# Patient Record
Sex: MALE | Race: BLACK OR AFRICAN AMERICAN | NOT HISPANIC OR LATINO | URBAN - METROPOLITAN AREA
[De-identification: names, ages, dates, MRNs, and addresses within clinical notes are randomized per-mention and may not be internally consistent; named-entity substitution may affect disease eponyms.]

---

## 2020-12-22 ENCOUNTER — OFFICE VISIT (OUTPATIENT)
Dept: URBAN - METROPOLITAN AREA CLINIC 94 | Facility: CLINIC | Age: 65
End: 2020-12-22

## 2021-01-12 ENCOUNTER — WEB ENCOUNTER (OUTPATIENT)
Dept: URBAN - METROPOLITAN AREA CLINIC 94 | Facility: CLINIC | Age: 66
End: 2021-01-12

## 2021-01-12 ENCOUNTER — OFFICE VISIT (OUTPATIENT)
Dept: URBAN - METROPOLITAN AREA CLINIC 94 | Facility: CLINIC | Age: 66
End: 2021-01-12
Payer: MEDICARE

## 2021-01-12 DIAGNOSIS — K51.90 ULCERATIVE COLITIS: ICD-10-CM

## 2021-01-12 PROCEDURE — G8420 CALC BMI NORM PARAMETERS: HCPCS | Performed by: INTERNAL MEDICINE

## 2021-01-12 PROCEDURE — 99213 OFFICE O/P EST LOW 20 MIN: CPT | Performed by: INTERNAL MEDICINE

## 2021-01-12 PROCEDURE — G8427 DOCREV CUR MEDS BY ELIG CLIN: HCPCS | Performed by: INTERNAL MEDICINE

## 2021-01-12 PROCEDURE — 3017F COLORECTAL CA SCREEN DOC REV: CPT | Performed by: INTERNAL MEDICINE

## 2021-01-12 RX ORDER — MESALAMINE 1.2 G/1
2 TABLETS TABLET, DELAYED RELEASE ORAL ONCE A DAY
Refills: 0 | Status: ACTIVE | COMMUNITY
Start: 1900-01-01

## 2021-01-12 RX ORDER — MESALAMINE 1.2 G/1
2 TABLETS TABLET, DELAYED RELEASE ORAL ONCE A DAY
Qty: 180 TABLET | Refills: 4

## 2021-01-12 RX ORDER — BIFIDOBACTERIUM LONGUM 10MM CELL
CAPSULE ORAL
Qty: 0 | Refills: 0 | Status: DISCONTINUED | COMMUNITY
Start: 1900-01-01

## 2021-01-12 RX ORDER — TROLAMINE SALICYLATE 10 %
APPLY TO AFFECTED AREA CREAM (GRAM) TOPICAL
Qty: 1 | Refills: 0 | Status: DISCONTINUED | COMMUNITY
Start: 1900-01-01

## 2021-01-12 RX ORDER — ACETAMINOPHEN 650 MG/1
TABLET, FILM COATED, EXTENDED RELEASE ORAL
Qty: 0 | Refills: 0 | Status: DISCONTINUED | COMMUNITY
Start: 1900-01-01

## 2021-01-12 RX ORDER — IBUPROFEN 200 MG/1
1 TABLET WITH FOOD OR MILK AS NEEDED TABLET, FILM COATED ORAL PRN
Status: ACTIVE | COMMUNITY

## 2021-01-12 NOTE — HPI-TODAY'S VISIT:
Pt has history of UC for 15 years. Last colonoscopy 2018 showed UC in remission without any dysplasia. Pt currently in clinical remission on lialda 2.4 g/day. Pt denies any GI symptoms such as diarrhea, abdominal pain or rectal bleeding.

## 2021-01-26 ENCOUNTER — TELEPHONE ENCOUNTER (OUTPATIENT)
Dept: URBAN - METROPOLITAN AREA CLINIC 94 | Facility: CLINIC | Age: 66
End: 2021-01-26

## 2021-05-05 PROBLEM — 235595009 GASTROESOPHAGEAL REFLUX DISEASE: Status: ACTIVE | Noted: 2021-05-05

## 2021-05-21 ENCOUNTER — OFFICE VISIT (OUTPATIENT)
Dept: URBAN - METROPOLITAN AREA SURGERY CENTER 17 | Facility: SURGERY CENTER | Age: 66
End: 2021-05-21

## 2021-06-02 ENCOUNTER — OFFICE VISIT (OUTPATIENT)
Dept: URBAN - METROPOLITAN AREA SURGERY CENTER 17 | Facility: SURGERY CENTER | Age: 66
End: 2021-06-02

## 2021-06-29 ENCOUNTER — OFFICE VISIT (OUTPATIENT)
Dept: URBAN - METROPOLITAN AREA SURGERY CENTER 17 | Facility: SURGERY CENTER | Age: 66
End: 2021-06-29
Payer: MEDICARE

## 2021-06-29 DIAGNOSIS — K63.89 BACTERIAL OVERGROWTH SYNDROME: ICD-10-CM

## 2021-06-29 DIAGNOSIS — K51.00 CHRONIC PANCOLONIC ULCERATIVE COLITIS: ICD-10-CM

## 2021-06-29 PROCEDURE — 45380 COLONOSCOPY AND BIOPSY: CPT | Performed by: INTERNAL MEDICINE

## 2021-06-29 PROCEDURE — G8907 PT DOC NO EVENTS ON DISCHARG: HCPCS | Performed by: INTERNAL MEDICINE

## 2021-06-29 RX ORDER — IBUPROFEN 200 MG/1
1 TABLET WITH FOOD OR MILK AS NEEDED TABLET, FILM COATED ORAL PRN
Status: ACTIVE | COMMUNITY

## 2021-06-29 RX ORDER — MESALAMINE 1.2 G/1
2 TABLETS TABLET, DELAYED RELEASE ORAL ONCE A DAY
Qty: 180 TABLET | Refills: 4 | Status: ACTIVE | COMMUNITY

## 2022-02-09 ENCOUNTER — TELEPHONE ENCOUNTER (OUTPATIENT)
Dept: URBAN - METROPOLITAN AREA CLINIC 94 | Facility: CLINIC | Age: 67
End: 2022-02-09

## 2022-02-09 RX ORDER — MESALAMINE 1.2 G/1
2 TABLETS TABLET, DELAYED RELEASE ORAL ONCE A DAY
Qty: 180 TABLET | Refills: 4
End: 2023-05-05

## 2022-03-28 ENCOUNTER — TELEPHONE ENCOUNTER (OUTPATIENT)
Dept: URBAN - METROPOLITAN AREA CLINIC 94 | Facility: CLINIC | Age: 67
End: 2022-03-28

## 2022-03-28 RX ORDER — MESALAMINE 1.2 G/1
2 TABLETS TABLET, DELAYED RELEASE ORAL ONCE A DAY
Qty: 180 TABLET | Refills: 4
End: 2023-06-21

## 2022-04-08 ENCOUNTER — TELEPHONE ENCOUNTER (OUTPATIENT)
Dept: URBAN - METROPOLITAN AREA CLINIC 94 | Facility: CLINIC | Age: 67
End: 2022-04-08

## 2022-04-12 ENCOUNTER — WEB ENCOUNTER (OUTPATIENT)
Dept: URBAN - METROPOLITAN AREA CLINIC 94 | Facility: CLINIC | Age: 67
End: 2022-04-12

## 2022-04-12 ENCOUNTER — OFFICE VISIT (OUTPATIENT)
Dept: URBAN - METROPOLITAN AREA CLINIC 94 | Facility: CLINIC | Age: 67
End: 2022-04-12
Payer: MEDICARE

## 2022-04-12 VITALS
SYSTOLIC BLOOD PRESSURE: 164 MMHG | DIASTOLIC BLOOD PRESSURE: 85 MMHG | HEART RATE: 79 BPM | WEIGHT: 201 LBS | TEMPERATURE: 97.5 F | HEIGHT: 68 IN | BODY MASS INDEX: 30.46 KG/M2

## 2022-04-12 DIAGNOSIS — K51.90 ULCERATIVE COLITIS: ICD-10-CM

## 2022-04-12 PROCEDURE — 99213 OFFICE O/P EST LOW 20 MIN: CPT | Performed by: PHYSICIAN ASSISTANT

## 2022-04-12 RX ORDER — MESALAMINE 1.2 G/1
2 TABLETS TABLET, DELAYED RELEASE ORAL ONCE A DAY
Qty: 180 TABLET | Refills: 4 | Status: ACTIVE | COMMUNITY
End: 2023-06-21

## 2022-04-12 RX ORDER — MESALAMINE 1.2 G/1
2 TABLETS TABLET, DELAYED RELEASE ORAL ONCE A DAY
Qty: 180 | Refills: 2

## 2022-04-12 RX ORDER — IBUPROFEN 200 MG/1
1 TABLET WITH FOOD OR MILK AS NEEDED TABLET, FILM COATED ORAL PRN
Status: ON HOLD | COMMUNITY

## 2022-04-12 NOTE — HPI-TODAY'S VISIT:
67 yo M evaluated today for hx of UC.   Pt has history of UC for 15 years. Last colonoscopy 2021 showed UC in remission without any dysplasia. Pt currently in clinical remission on lialda 2.4 g/day. Pt denies any GI symptoms such as diarrhea, abdominal pain or rectal bleeding, or changes in bowel habits.

## 2022-04-12 NOTE — PHYSICAL EXAM LYMPHATIC:
Neck , no lymphadenopathy · Goal while inpatient is a systolic blood pressure less than 160mmHg  · BP in acceptable range at this time  · Continue current home regimen with hold parameters  · PRN antihypertensives available

## 2022-04-13 LAB
A/G RATIO: 1.5
ALBUMIN: 4.8
ALKALINE PHOSPHATASE: 138
ALT (SGPT): 29
AST (SGOT): 22
BASO (ABSOLUTE): 0
BASOS: 0
BILIRUBIN, TOTAL: 0.6
BUN/CREATININE RATIO: 19
BUN: 20
CALCIUM: 9.6
CARBON DIOXIDE, TOTAL: 21
CHLORIDE: 100
CREATININE: 1.08
EGFR: 75
EOS (ABSOLUTE): 0.1
EOS: 1
GLOBULIN, TOTAL: 3.2
GLUCOSE: 85
HEMATOCRIT: 44.8
HEMATOLOGY COMMENTS:: (no result)
HEMOGLOBIN: 14.5
IMMATURE CELLS: (no result)
IMMATURE GRANS (ABS): 0
IMMATURE GRANULOCYTES: 0
LYMPHS (ABSOLUTE): 2.1
LYMPHS: 21
MCH: 27.5
MCHC: 32.4
MCV: 85
MONOCYTES(ABSOLUTE): 0.9
MONOCYTES: 9
NEUTROPHILS (ABSOLUTE): 6.7
NEUTROPHILS: 69
NRBC: (no result)
PLATELETS: 296
POTASSIUM: 4.4
PROTEIN, TOTAL: 8
RBC: 5.27
RDW: 13.6
SODIUM: 137
WBC: 9.8

## 2022-06-24 NOTE — PHYSICAL EXAM CONSTITUTIONAL:
-fmhx of colon cancer grandfather  -due for screening colonoscopy soon at 39 y/o   agreeable for GI referral and placed well developed, well nourished , in no acute distress , ambulating without difficulty , normal communication ability

## 2023-03-07 ENCOUNTER — OFFICE VISIT (OUTPATIENT)
Dept: URBAN - METROPOLITAN AREA CLINIC 94 | Facility: CLINIC | Age: 68
End: 2023-03-07
Payer: MEDICARE

## 2023-03-07 VITALS
SYSTOLIC BLOOD PRESSURE: 150 MMHG | BODY MASS INDEX: 27.13 KG/M2 | DIASTOLIC BLOOD PRESSURE: 80 MMHG | HEART RATE: 74 BPM | TEMPERATURE: 97.3 F | HEIGHT: 68 IN | WEIGHT: 179 LBS

## 2023-03-07 DIAGNOSIS — K51.80 OTHER ULCERATIVE COLITIS: ICD-10-CM

## 2023-03-07 PROBLEM — 64766004 ULCERATIVE COLITIS: Status: ACTIVE | Noted: 2021-01-12

## 2023-03-07 PROCEDURE — 99214 OFFICE O/P EST MOD 30 MIN: CPT | Performed by: PHYSICIAN ASSISTANT

## 2023-03-07 RX ORDER — IBUPROFEN 200 MG/1
1 TABLET WITH FOOD OR MILK AS NEEDED TABLET, FILM COATED ORAL PRN
Status: ON HOLD | COMMUNITY

## 2023-03-07 RX ORDER — MESALAMINE 1.2 G/1
2 TABLETS TABLET, DELAYED RELEASE ORAL ONCE A DAY
Qty: 180 | Refills: 4

## 2023-03-07 RX ORDER — MESALAMINE 1.2 G/1
2 TABLETS TABLET, DELAYED RELEASE ORAL ONCE A DAY
Qty: 180 | Refills: 2 | Status: ACTIVE | COMMUNITY

## 2023-03-07 NOTE — HPI-TODAY'S VISIT:
65 yo M evaluated today for hx of UC.   Today patient returns for 1 yr f/u visit. Pt is doing very well and denies GI sx. Pt denies abdominal pain, melena or heamtochezia. Pt denies diarrhea and reports having 1 solid stool/day. Denies unintentional wt loss.   Pt has history of UC for 15 years. Last colonoscopy 2021 showed UC in remission without any dysplasia. Pt currently in clinical remission on lialda 2.4 g/day.

## 2023-03-08 LAB
A/G RATIO: 1.5
ABSOLUTE BASOPHILS: 19
ABSOLUTE EOSINOPHILS: 82
ABSOLUTE LYMPHOCYTES: 1720
ABSOLUTE MONOCYTES: 529
ABSOLUTE NEUTROPHILS: 3950
ALBUMIN: 4.3
ALKALINE PHOSPHATASE: 98
ALT (SGPT): 21
AST (SGOT): 23
BASOPHILS: 0.3
BILIRUBIN, TOTAL: 0.6
BUN/CREATININE RATIO: (no result)
BUN: 12
C-REACTIVE PROTEIN, QUANT: 3.8
CALCIUM: 9.4
CARBON DIOXIDE, TOTAL: 26
CHLORIDE: 102
CREATININE: 0.93
EGFR: 89
EOSINOPHILS: 1.3
GLOBULIN, TOTAL: 2.9
GLUCOSE: 81
HEMATOCRIT: 40.6
HEMOGLOBIN: 13.7
LYMPHOCYTES: 27.3
MCH: 28.1
MCHC: 33.7
MCV: 83.2
MONOCYTES: 8.4
MPV: 10.2
NEUTROPHILS: 62.7
PLATELET COUNT: 254
POTASSIUM: 3.9
PROTEIN, TOTAL: 7.2
RDW: 13.3
RED BLOOD CELL COUNT: 4.88
SODIUM: 137
WHITE BLOOD CELL COUNT: 6.3

## 2023-03-14 PROBLEM — 64766004 ULCERATIVE COLITIS: Status: ACTIVE | Noted: 2023-03-14

## 2023-04-11 ENCOUNTER — OFFICE VISIT (OUTPATIENT)
Dept: URBAN - METROPOLITAN AREA CLINIC 94 | Facility: CLINIC | Age: 68
End: 2023-04-11

## 2023-07-11 ENCOUNTER — OFFICE VISIT (OUTPATIENT)
Dept: URBAN - METROPOLITAN AREA SURGERY CENTER 17 | Facility: SURGERY CENTER | Age: 68
End: 2023-07-11

## 2023-07-18 ENCOUNTER — OFFICE VISIT (OUTPATIENT)
Dept: URBAN - METROPOLITAN AREA SURGERY CENTER 17 | Facility: SURGERY CENTER | Age: 68
End: 2023-07-18

## 2023-07-18 ENCOUNTER — CLAIMS CREATED FROM THE CLAIM WINDOW (OUTPATIENT)
Dept: URBAN - METROPOLITAN AREA CLINIC 4 | Facility: CLINIC | Age: 68
End: 2023-07-18
Payer: MEDICARE

## 2023-07-18 ENCOUNTER — CLAIMS CREATED FROM THE CLAIM WINDOW (OUTPATIENT)
Dept: URBAN - METROPOLITAN AREA SURGERY CENTER 17 | Facility: SURGERY CENTER | Age: 68
End: 2023-07-18
Payer: MEDICARE

## 2023-07-18 DIAGNOSIS — K63.89 OTHER SPECIFIED DISEASES OF INTESTINE: ICD-10-CM

## 2023-07-18 DIAGNOSIS — K63.89 APPENDICITIS EPIPLOICA: ICD-10-CM

## 2023-07-18 PROCEDURE — 88305 TISSUE EXAM BY PATHOLOGIST: CPT | Performed by: PATHOLOGY

## 2023-07-18 PROCEDURE — G8907 PT DOC NO EVENTS ON DISCHARG: HCPCS | Performed by: INTERNAL MEDICINE

## 2023-07-18 PROCEDURE — 45380 COLONOSCOPY AND BIOPSY: CPT | Performed by: INTERNAL MEDICINE

## 2023-11-02 ENCOUNTER — DASHBOARD ENCOUNTERS (OUTPATIENT)
Age: 68
End: 2023-11-02

## 2023-11-02 ENCOUNTER — OFFICE VISIT (OUTPATIENT)
Dept: URBAN - METROPOLITAN AREA CLINIC 94 | Facility: CLINIC | Age: 68
End: 2023-11-02
Payer: MEDICARE

## 2023-11-02 VITALS
TEMPERATURE: 98.1 F | DIASTOLIC BLOOD PRESSURE: 69 MMHG | WEIGHT: 167 LBS | BODY MASS INDEX: 25.31 KG/M2 | SYSTOLIC BLOOD PRESSURE: 144 MMHG | HEART RATE: 64 BPM | HEIGHT: 68 IN

## 2023-11-02 DIAGNOSIS — K51.90: ICD-10-CM

## 2023-11-02 DIAGNOSIS — K64.8 INTERNAL HEMORRHOIDS: ICD-10-CM

## 2023-11-02 PROBLEM — 64766004: Status: ACTIVE | Noted: 2023-11-02

## 2023-11-02 PROCEDURE — 99214 OFFICE O/P EST MOD 30 MIN: CPT | Performed by: INTERNAL MEDICINE

## 2023-11-02 RX ORDER — MESALAMINE 1.2 G/1
2 TABLETS TABLET, DELAYED RELEASE ORAL ONCE A DAY
Qty: 180 | Refills: 4 | Status: ACTIVE | COMMUNITY

## 2023-11-02 RX ORDER — IBUPROFEN 200 MG/1
1 TABLET WITH FOOD OR MILK AS NEEDED TABLET, FILM COATED ORAL PRN
Status: ON HOLD | COMMUNITY

## 2023-11-02 NOTE — HPI-TODAY'S VISIT:
65 yo M evaluated today for hx of UC.Pt has history of UC for 15 years. Last colonoscopy 7/2023 showed normal mucosa to cecum and internal hemorrhoids. Biopsies from all areas of colon as per surveillance protocol were all normal without active colitis or dysplasia Pt states that he developed a dull rectal pain with BM a few weeks ago when he was straining. SInce then he has changed his diet with more fiber and pain has resolved. Denies rectal bleeding, diarrhea or abdominal pain.  Pt is compliant with mesalamine for UC.

## 2024-01-25 ENCOUNTER — TELEPHONE ENCOUNTER (OUTPATIENT)
Dept: URBAN - METROPOLITAN AREA CLINIC 94 | Facility: CLINIC | Age: 69
End: 2024-01-25

## 2024-05-07 ENCOUNTER — OFFICE VISIT (OUTPATIENT)
Dept: URBAN - METROPOLITAN AREA CLINIC 94 | Facility: CLINIC | Age: 69
End: 2024-05-07

## 2024-12-19 ENCOUNTER — LAB OUTSIDE AN ENCOUNTER (OUTPATIENT)
Dept: URBAN - METROPOLITAN AREA CLINIC 94 | Facility: CLINIC | Age: 69
End: 2024-12-19

## 2024-12-19 ENCOUNTER — OFFICE VISIT (OUTPATIENT)
Dept: URBAN - METROPOLITAN AREA CLINIC 94 | Facility: CLINIC | Age: 69
End: 2024-12-19
Payer: MEDICARE

## 2024-12-19 VITALS
WEIGHT: 171 LBS | DIASTOLIC BLOOD PRESSURE: 76 MMHG | HEIGHT: 68 IN | HEART RATE: 72 BPM | TEMPERATURE: 98.8 F | SYSTOLIC BLOOD PRESSURE: 167 MMHG | BODY MASS INDEX: 25.91 KG/M2

## 2024-12-19 DIAGNOSIS — K51.90 ULCERATIVE COLITIS WITHOUT COMPLICATIONS, UNSPECIFIED LOCATION: ICD-10-CM

## 2024-12-19 PROCEDURE — 99213 OFFICE O/P EST LOW 20 MIN: CPT | Performed by: INTERNAL MEDICINE

## 2024-12-19 RX ORDER — MESALAMINE 1.2 G/1
2 TABLETS TABLET, DELAYED RELEASE ORAL ONCE A DAY
Qty: 180 | Refills: 4 | Status: ACTIVE | COMMUNITY

## 2024-12-19 RX ORDER — BIFIDOBACTERIUM LONGUM 10MM CELL
CAPSULE ORAL
Qty: 0 | Refills: 0 | Status: ACTIVE | COMMUNITY

## 2024-12-19 RX ORDER — IBUPROFEN 200 MG/1
1 TABLET WITH FOOD OR MILK AS NEEDED TABLET, FILM COATED ORAL PRN
Status: ON HOLD | COMMUNITY

## 2024-12-19 NOTE — HPI-TODAY'S VISIT:
65 yo M evaluated today for hx of UC.Pt has history of UC for 15 years. Last colonoscopy 7/2023 showed normal mucosa to cecum and internal hemorrhoids. Biopsies from all areas of colon as per surveillance protocol were all normal without active colitis or dysplasia -Denies rectal bleeding, diarrhea or abdominal pain.  -Pt is compliant with mesalamine for UC.

## 2025-03-17 ENCOUNTER — LAB OUTSIDE AN ENCOUNTER (OUTPATIENT)
Dept: URBAN - METROPOLITAN AREA CLINIC 94 | Facility: CLINIC | Age: 70
End: 2025-03-17

## 2025-03-17 ENCOUNTER — OFFICE VISIT (OUTPATIENT)
Dept: URBAN - METROPOLITAN AREA CLINIC 94 | Facility: CLINIC | Age: 70
End: 2025-03-17
Payer: MEDICARE

## 2025-03-17 VITALS
WEIGHT: 173.2 LBS | SYSTOLIC BLOOD PRESSURE: 157 MMHG | TEMPERATURE: 98.1 F | HEIGHT: 68 IN | HEART RATE: 60 BPM | OXYGEN SATURATION: 95 % | BODY MASS INDEX: 26.25 KG/M2 | DIASTOLIC BLOOD PRESSURE: 80 MMHG

## 2025-03-17 DIAGNOSIS — K56.699 SMALL BOWEL STRICTURE: ICD-10-CM

## 2025-03-17 DIAGNOSIS — K61.1 RECTAL ABSCESS: ICD-10-CM

## 2025-03-17 DIAGNOSIS — K51.90 ULCERATIVE COLITIS WITHOUT COMPLICATIONS, UNSPECIFIED LOCATION: ICD-10-CM

## 2025-03-17 DIAGNOSIS — L98.8 FISTULA: ICD-10-CM

## 2025-03-17 PROCEDURE — 99215 OFFICE O/P EST HI 40 MIN: CPT | Performed by: PHYSICIAN ASSISTANT

## 2025-03-17 RX ORDER — BIFIDOBACTERIUM LONGUM 10MM CELL
CAPSULE ORAL
Qty: 0 | Refills: 0 | Status: ACTIVE | COMMUNITY

## 2025-03-17 RX ORDER — MESALAMINE 1.2 G/1
2 TABLETS TABLET, DELAYED RELEASE ORAL ONCE A DAY
Qty: 180 | Refills: 4 | Status: ACTIVE | COMMUNITY

## 2025-03-17 RX ORDER — IBUPROFEN 200 MG/1
1 TABLET WITH FOOD OR MILK AS NEEDED TABLET, FILM COATED ORAL PRN
Status: ACTIVE | COMMUNITY

## 2025-03-17 NOTE — HPI-TODAY'S VISIT:
68 yo M evaluated today following [facility] ER visit due to rectal abscess with abnormal CT revealing dilated loops SB concerning for Crohn's Disease   Patient evaluated at [facility] ER visit on 2/26 for rectal abscess and notes history of hemorrhoids with similar abscess, stating this is his 4th one. He states his current pain is so severe that it inhibits sitting comfortably. Denies drainage or opening of abscess, notes pain is also exacerbated with coughing.  Denies bloody or black stools.  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 Borderline dilated loops of small bowel measuring up to 3.1 cm. No definite transition point is identified. This may represent the sequela of known Crohn's disease with possible strictures and upstream dilatation, and less likely ileus or partial small bowel obstruction. Concentric bladder wall thickening with a prominent diverticulum along the dome of the bladder.  Labs - CBC CMP - unremarkable.   Per pt I&D performed on abscess and no further intervention was needed. He denies any further pain or drainage present. Pt denies GI sx today. Pt denies abdominal pain.  ER note, patient was referred to colorectal MD Dr Karla Black but he did not follow-up with her   -history of UC for 15 years. Last colonoscopy 7/2023 showed normal mucosa to cecum and internal hemorrhoids. Biopsies from all areas of colon as per surveillance protocol were all normal without active colitis or dysplasia -Denies rectal bleeding, diarrhea or abdominal pain.  -Pt is compliant with mesalamine for UC.

## 2025-03-18 LAB
C-REACTIVE PROTEIN, QUANT: <3
SED RATE BY MODIFIED: 2

## 2025-03-20 ENCOUNTER — TELEPHONE ENCOUNTER (OUTPATIENT)
Dept: URBAN - METROPOLITAN AREA CLINIC 94 | Facility: CLINIC | Age: 70
End: 2025-03-20

## 2025-03-29 LAB — CALPROTECTIN, FECAL: <5

## 2025-05-12 ENCOUNTER — TELEPHONE ENCOUNTER (OUTPATIENT)
Dept: URBAN - METROPOLITAN AREA CLINIC 94 | Facility: CLINIC | Age: 70
End: 2025-05-12

## 2025-05-30 ENCOUNTER — TELEPHONE ENCOUNTER (OUTPATIENT)
Dept: URBAN - METROPOLITAN AREA CLINIC 94 | Facility: CLINIC | Age: 70
End: 2025-05-30

## 2025-06-02 ENCOUNTER — LAB OUTSIDE AN ENCOUNTER (OUTPATIENT)
Dept: URBAN - METROPOLITAN AREA CLINIC 94 | Facility: CLINIC | Age: 70
End: 2025-06-02

## 2025-06-02 PROBLEM — 64766004: Status: ACTIVE | Noted: 2025-06-02

## 2025-06-05 ENCOUNTER — ERX REFILL RESPONSE (OUTPATIENT)
Dept: URBAN - METROPOLITAN AREA CLINIC 94 | Facility: CLINIC | Age: 70
End: 2025-06-05

## 2025-06-05 RX ORDER — MESALAMINE 1.2 G/1
2 TABLETS TABLET, DELAYED RELEASE ORAL ONCE A DAY
Qty: 180 | Refills: 3

## 2025-06-12 ENCOUNTER — CLAIMS CREATED FROM THE CLAIM WINDOW (OUTPATIENT)
Dept: URBAN - METROPOLITAN AREA SURGERY CENTER 17 | Facility: SURGERY CENTER | Age: 70
End: 2025-06-12
Payer: MEDICARE

## 2025-06-12 ENCOUNTER — CLAIMS CREATED FROM THE CLAIM WINDOW (OUTPATIENT)
Dept: URBAN - METROPOLITAN AREA CLINIC 4 | Facility: CLINIC | Age: 70
End: 2025-06-12
Payer: MEDICARE

## 2025-06-12 DIAGNOSIS — K63.89 OTHER SPECIFIED DISEASES OF INTESTINE: ICD-10-CM

## 2025-06-12 DIAGNOSIS — K51.00 ULCERATIVE CHRONIC PANCOLITIS, WITHOUT COMPLICATIONS: ICD-10-CM

## 2025-06-12 DIAGNOSIS — K51.90 ULCERATIVE COLITIS: ICD-10-CM

## 2025-06-12 PROCEDURE — 88305 TISSUE EXAM BY PATHOLOGIST: CPT | Performed by: PATHOLOGY

## 2025-06-12 PROCEDURE — 45378 DIAGNOSTIC COLONOSCOPY: CPT | Performed by: INTERNAL MEDICINE

## 2025-06-12 PROCEDURE — 00811 ANES LWR INTST NDSC NOS: CPT | Performed by: NURSE ANESTHETIST, CERTIFIED REGISTERED

## 2025-06-12 RX ORDER — MESALAMINE 1.2 G/1
2 TABLETS TABLET, DELAYED RELEASE ORAL ONCE A DAY
Qty: 180 | Refills: 3 | Status: ACTIVE | COMMUNITY

## 2025-06-12 RX ORDER — BIFIDOBACTERIUM LONGUM 10MM CELL
CAPSULE ORAL
Qty: 0 | Refills: 0 | Status: ACTIVE | COMMUNITY

## 2025-06-12 RX ORDER — IBUPROFEN 200 MG/1
1 TABLET WITH FOOD OR MILK AS NEEDED TABLET, FILM COATED ORAL PRN
Status: ACTIVE | COMMUNITY

## 2025-07-01 ENCOUNTER — OFFICE VISIT (OUTPATIENT)
Dept: URBAN - METROPOLITAN AREA CLINIC 94 | Facility: CLINIC | Age: 70
End: 2025-07-01

## 2025-08-06 ENCOUNTER — OFFICE VISIT (OUTPATIENT)
Dept: URBAN - METROPOLITAN AREA CLINIC 94 | Facility: CLINIC | Age: 70
End: 2025-08-06
Payer: MEDICARE

## 2025-08-06 DIAGNOSIS — K51.90 ULCERATIVE COLITIS WITHOUT COMPLICATIONS, UNSPECIFIED LOCATION: ICD-10-CM

## 2025-08-06 DIAGNOSIS — L98.8 FISTULA: ICD-10-CM

## 2025-08-06 DIAGNOSIS — K61.1 RECTAL ABSCESS: ICD-10-CM

## 2025-08-06 PROCEDURE — 99214 OFFICE O/P EST MOD 30 MIN: CPT | Performed by: PHYSICIAN ASSISTANT

## 2025-08-06 RX ORDER — BIFIDOBACTERIUM LONGUM 10MM CELL
CAPSULE ORAL
Qty: 0 | Refills: 0 | Status: ACTIVE | COMMUNITY

## 2025-08-06 RX ORDER — MESALAMINE 1.2 G/1
2 TABLETS TABLET, DELAYED RELEASE ORAL ONCE A DAY
Qty: 180 | Refills: 3 | Status: ACTIVE | COMMUNITY

## 2025-08-06 RX ORDER — IBUPROFEN 200 MG/1
1 TABLET WITH FOOD OR MILK AS NEEDED TABLET, FILM COATED ORAL PRN
Status: ACTIVE | COMMUNITY